# Patient Record
Sex: MALE | Race: WHITE | Employment: FULL TIME | URBAN - METROPOLITAN AREA
[De-identification: names, ages, dates, MRNs, and addresses within clinical notes are randomized per-mention and may not be internally consistent; named-entity substitution may affect disease eponyms.]

---

## 2021-06-01 ENCOUNTER — TELEPHONE (OUTPATIENT)
Dept: ADMINISTRATIVE | Age: 26
End: 2021-06-01

## 2021-06-01 NOTE — TELEPHONE ENCOUNTER
Pt is requesting to establish Dr Peter Mann as PCP; referred by Marly Powell. Pt is aware of [facility] med protocol; states no med issues.  Please advise

## 2021-06-09 ENCOUNTER — OFFICE VISIT (OUTPATIENT)
Dept: PRIMARY CARE CLINIC | Age: 26
End: 2021-06-09
Payer: COMMERCIAL

## 2021-06-09 VITALS
OXYGEN SATURATION: 97 % | HEIGHT: 67 IN | BODY MASS INDEX: 29.66 KG/M2 | RESPIRATION RATE: 16 BRPM | SYSTOLIC BLOOD PRESSURE: 116 MMHG | WEIGHT: 189 LBS | TEMPERATURE: 97 F | DIASTOLIC BLOOD PRESSURE: 68 MMHG | HEART RATE: 67 BPM

## 2021-06-09 DIAGNOSIS — D49.2 NEOPLASM OF SOFT TISSUE OF NECK: Primary | ICD-10-CM

## 2021-06-09 DIAGNOSIS — Z00.00 HEALTH MAINTENANCE EXAMINATION: ICD-10-CM

## 2021-06-09 PROCEDURE — 99203 OFFICE O/P NEW LOW 30 MIN: CPT | Performed by: FAMILY MEDICINE

## 2021-06-09 ASSESSMENT — PATIENT HEALTH QUESTIONNAIRE - PHQ9
SUM OF ALL RESPONSES TO PHQ QUESTIONS 1-9: 0
SUM OF ALL RESPONSES TO PHQ QUESTIONS 1-9: 0
1. LITTLE INTEREST OR PLEASURE IN DOING THINGS: 0
SUM OF ALL RESPONSES TO PHQ QUESTIONS 1-9: 0
SUM OF ALL RESPONSES TO PHQ9 QUESTIONS 1 & 2: 0
2. FEELING DOWN, DEPRESSED OR HOPELESS: 0

## 2021-06-09 NOTE — PROGRESS NOTES
21  Nunu Harley : 1995 Sex: male  Age: 30 y.o. Chief Complaint   Patient presents with    New Patient    Establish Care    Mass     lump on right side of neck x1 year, no pain, no growth       HPI  HPI:    Patient presents today as a new patient to establish, has noticed a lump on the right side of his neck for a year. Appeared suddenly he states and there has been no apparent change. There is no pain or symptoms associated with it. No change in weight. No night sweats fever or other systemic symptoms. No family history. Review of Systems  ROS:  Const: Denies chills, fever, malaise and sweats. Eyes: Denies discharge, pain, redness and visual disturbance. ENMT: Denies earaches, other ear symptoms. Denies nasal or sinus symptoms other than stated  above. Denies mouth and tongue lesions and sore throat. CV: Denies chest discomfort, pain; diaphoresis, dizziness, edema, lightheadedness, orthopnea,  palpitations, syncope and near syncopal episode or any exertional symptoms  Resp: Denies cough, hemoptysis, pleuritic pain, SOB, sputum production and wheezing. GI: Denies abdominal pain, change in bowel habits, hematochezia, melena, nausea and vomiting. : Denies urinary symptoms including dysuria , urgency, frequency or hematuria. Musculo: Denies musculoskeletal symptoms. Skin: Denies bruising and rash. Neuro: Denies headache, numbness, stiff neck, tingling and focal weakness slurred speech or facial  droop  Hema/Lymph: Denies bleeding/bruising tendency and enlarged lymph nodes      No current outpatient medications on file.   No Known Allergies    Past Medical History:   Diagnosis Date    Patient denies medical problems      Past Surgical History:   Procedure Laterality Date    HAND TENDON SURGERY Left     Landscaping Injury, around age 10   [place] WISDOM TOOTH EXTRACTION       Family History   Problem Relation Age of Onset    Other Neg Hx         Denies early ht disease, cancers or otherwise     Social History     Tobacco Use    Smoking status: Never Smoker    Smokeless tobacco: Never Used   Substance Use Topics    Alcohol use: Yes     Comment: occ    Drug use: No      Social History     Social History Narrative    Previous [employer] x 9 yrs    Now  for [employer], involved with regulation [employer] pressure) [employer]     Mom/Dad . Mom lives [place]. Minimal if any contact with Dad x yrs    Dale Pabon girlfriend        Vitals:    06/09/21 1453   BP: 116/68   Pulse: 67   Resp: 16   Temp: 97 °F (36.1 °C)   SpO2: 97%   Weight: 189 lb (85.7 kg)   Height: 5' 7\" (1.702 m)      Wt Readings from Last 3 Encounters:   06/09/21 189 lb (85.7 kg)        Physical Exam  Exam:  Const: Appears comfortable. No signs of acute distress present. Head/Face: Atraumatic on inspection. Eyes: EOMI in both eyes. No discharge from the eyes. PERRL. Sclerae clear. ENMT: Auditory canals normal. Tympanic membranes: intact and translucent. External nose WNL. Nasal mucosa is clear. Oropharynx: No erythema or exudate. Posterior pharynx is normal.  Neck: Supple. Palpation reveals 2 x 2 centimeter protruding well-circumscribed skin colored nodule that is firm/rubbery. No other appreciable masses/lymph nodes no JVD. Carotids: no  bruits. Resp: Respirations are unlabored. Clear to auscultation. No rales, rhonchi or wheezes appreciated  over the lungs bilaterally. CV: Rate is regular. Rhythm is regular. No gallop or rubs. No heart murmur appreciated. Extremities: No clubbing, cyanosis, or edema. No calf inflammation or tenderness. Abdomen: Bowel sounds are normoactive. Abdomen is soft, nontender, and nondistended. No  abdominal masses. No palpable hepatosplenomegaly. Lymph: No palpable or visible regional lymphadenopathy. Musculoskeletal: no acute joint inflammation. Skin: Dry and warm with no rash.  Skin normal to inspection and palpation overall. Neuro: Alert and oriented. Affect: appropriate. Upper Extremities: 5/5 bilaterally. Lower Extremities:  5/5 bilaterally. Sensation intact to light touch. Reflexes: DTR's are symmetric and 2+ bilaterally. .  Cranial Nerves: Cranial nerves grossly intact. Office Labs This Visit :  No results found for this visit on 06/09/21. Assessment and Plan:   Diagnosis Orders   1. Neoplasm of soft tissue of neck  [facility] Ear, Nose, Throat    US HEAD NECK SOFT TISSUE THYROID    TSH without Reflex    CBC Auto Differential   2. Health maintenance examination  Lipid Panel    TSH without Reflex    Comprehensive Metabolic Panel    CBC Auto Differential    Urinalysis       Neoplasm of soft tissue of neck    Counseled extensively. Differential reviewed, including serious etiologies. Although length of time present with lack of change favors a benign process including subdermoid cyst, lipoma other differential reviewed including but not limited to rare condition such as dermatofibrosarcoma protuberans as well as lymph node etc.  Refer to ENT as a starting point for excision. Check ultrasound. Check blood work. Health maintenance examination    encourage, he will schedule this in about a week or 2, check with insurance get blood work also he will bring in shot record         No flowsheet data found. Plan as above. Counseled extensively and differential diagnoses relevant to above were reviewed, including serious etiologies. Side effects and interactions of medications were reviewed. As long as symptoms steadily improve/resolve, and medical conditions follow the expected course, FU as below, sooner PRN. Return for 1-2wks fu us/bw and for physical.         Signs and symptoms to watch for discussed, serious signs and symptoms reviewed. ER if any.          Elizabeth Hayes MD    Patients are advised to check with insurance company to ensure coverage and to fully understand benefits and cost prior to any testing. This note was created with the assistance of voice recognition software. Document was reviewed however may contain grammatical errors.

## 2021-06-09 NOTE — ASSESSMENT & PLAN NOTE
encourage, he will schedule this in about a week or 2, check with insurance get blood work also he will bring in shot record

## 2021-06-18 ENCOUNTER — TELEPHONE (OUTPATIENT)
Dept: PRIMARY CARE CLINIC | Age: 26
End: 2021-06-18

## 2021-06-18 ENCOUNTER — TELEPHONE (OUTPATIENT)
Dept: ADMINISTRATIVE | Age: 26
End: 2021-06-18

## 2021-06-18 DIAGNOSIS — D49.2 NEOPLASM OF SOFT TISSUE OF NECK: Primary | ICD-10-CM

## 2021-06-18 NOTE — TELEPHONE ENCOUNTER
Referral was sent over for pt on 6/9. Pt was told to have imaging done before scheduling appt. Pt had u/s(in chart)  done on 6/17. Dr Jayme Gill is asking that pt be scheduled ASAP.

## 2021-06-18 NOTE — TELEPHONE ENCOUNTER
Yes please refer to [facility] ENT ASAP. Please have referral department call and facilitate for ASAP referral.  Diagnosis neck mass. Put rule out malignancy in the comments. .  Needs excised.   Please send ultrasound report to [facility], patient to bring films

## 2021-06-18 NOTE — TELEPHONE ENCOUNTER
Pt girlfiend calling asking for a new referral for ENT, unable to get him in until September.  Asking if he can be referred to [address] [place] [address] to an ENT

## 2021-06-18 NOTE — TELEPHONE ENCOUNTER
LVM for patient to call back to be scheduled with Dr. Eugene Thomas. Patient needs seen sooner rather than later. If patient calls back please offer 6/30/21 at 11:15am if not already triple booked.      Electronically signed by Dejuan Cardona MA on 6/18/21 at 2:35 PM EDT

## 2021-06-23 ENCOUNTER — OFFICE VISIT (OUTPATIENT)
Dept: PRIMARY CARE CLINIC | Age: 26
End: 2021-06-23
Payer: COMMERCIAL

## 2021-06-23 VITALS
SYSTOLIC BLOOD PRESSURE: 118 MMHG | OXYGEN SATURATION: 97 % | WEIGHT: 189 LBS | HEART RATE: 67 BPM | HEIGHT: 67 IN | BODY MASS INDEX: 29.66 KG/M2 | DIASTOLIC BLOOD PRESSURE: 70 MMHG | TEMPERATURE: 96.8 F

## 2021-06-23 DIAGNOSIS — Z00.00 HEALTH MAINTENANCE EXAMINATION: Primary | ICD-10-CM

## 2021-06-23 DIAGNOSIS — D49.2 NEOPLASM OF SOFT TISSUE OF NECK: ICD-10-CM

## 2021-06-23 PROCEDURE — 99395 PREV VISIT EST AGE 18-39: CPT | Performed by: FAMILY MEDICINE

## 2021-06-23 RX ORDER — AMOXICILLIN AND CLAVULANATE POTASSIUM 875; 125 MG/1; MG/1
TABLET, FILM COATED ORAL
COMMUNITY
Start: 2021-06-20

## 2021-06-23 NOTE — PROGRESS NOTES
21  Inell Gaster : 1995 Sex: male  Age: 30 y.o. Chief Complaint   Patient presents with   [address] Results       HPI  HPI:      Patient presents today for follow-up and physical.  Did not get labs I ordered yet, was in the [facility] emergency room this weekend and had some labs today including CBC CMP which were negative. They did a CT scan which I do not have access to. The ultrasound we ordered was reviewed with him. He was not able to get into local ENT in a timely fashion so it was converted to [facility], there was a 2-week delay and the mass on his neck significantly increased and so they went to the [facility] ER. He was seen by ENT there. At first they had concerns for a serious etiology but ultimately determined it to be a cyst and it was incised drained and he was placed on antibiotic/Augmentin. It is significantly better he states. Nontender. No drainage. The left it open he states. Tolerating antibiotic well. Precautions reviewed including C. difficile, risk benefits of probiotic reviewed. Culture grew skin mary      Health Maintenance:  Proper diet reviewed including Mediterranean and DASH diets. Counseled on healthy weight, appropriate exercise, avoidance of tobacco, and recommendations for minimal to no alcohol consumption. Counseled on the potential pros and cons of vitamins and supplements. Vaccines reviewed. Pt recently out of [employer], had multiple vaccines. He will try to get record before we update immunizations. Reviewed the recommendations and risk/benefits of vaccines including Td/Tdap,  Had covid vaccine x 2 pneumovax,  prevnar 13 , flu vaccine, Hepatitis vaccines, gardasil (counseled), and shingrix (patient had chicken pox). HIV and Hep C screening guidelines were reviewed. Importance of regular eye and dental exams and health reviewed. Risks/Benefits of ASA reviewed and discussed latest guidelines. Sun protection reviewed.  Notify if Abdomen: Bowel sounds are normoactive. Abdomen is soft, nontender, and nondistended. No  abdominal masses. No palpable hepatosplenomegaly. Lymph: No palpable or visible regional lymphadenopathy. Musculoskeletal: no acute joint inflammation. Skin: Dry and warm with no rash. Skin normal to inspection and palpation overall. Neuro: Alert and oriented. Affect: appropriate. Upper Extremities: 5/5 bilaterally. Lower Extremities:  5/5 bilaterally. Sensation intact to light touch. Reflexes: DTR's are symmetric and 2+ bilaterally. .  Cranial Nerves: Cranial nerves grossly intact. Genitalia NL male, testes descended without nodularity or hernia with and without valsalva. Office Labs This Visit :  No results found for this visit on 06/23/21. Assessment and Plan:   Diagnosis Orders   1. Health maintenance examination     2. Neoplasm of soft tissue of neck         Neoplasm of soft tissue of neck    counseled. Determined to be a cyst at [facility] ER this weekend, it was incised and drained by ENT and he will follow up with him closely. He is on Augmentin with precautions. He has had an ultrasound and CT scan. Health maintenance examination    Health maintenance issues discussed at length as above, 6/21. Encouraged yearly physicals. No flowsheet data found. Plan as above. Counseled extensively and differential diagnoses relevant to above were reviewed, including serious etiologies. Side effects and interactions of medications were reviewed. Continue per ENT. He will get blood work as ordered by me, he is going to plan follow-up in about 6 weeks to review sooner as needed and yearly physicals          As long as symptoms steadily improve/resolve, and medical conditions follow the expected course, FU as below, sooner PRN. Return in about 6 weeks (around 8/4/2021), or if symptoms worsen or fail to improve.          Signs and symptoms to watch for discussed, serious signs and symptoms reviewed. ER if any. Tad Stark MD    Patients are advised to check with insurance company to ensure coverage and to fully understand benefits and cost prior to any testing. This note was created with the assistance of voice recognition software. Document was reviewed however may contain grammatical errors.

## 2021-06-23 NOTE — ASSESSMENT & PLAN NOTE
counseled. Determined to be a cyst at [facility] clinic ER this weekend, it was incised and drained by ENT and he will follow up with him closely. He is on Augmentin with precautions. He has had an ultrasound and CT scan.

## 2021-07-09 PROBLEM — Z00.00 HEALTH MAINTENANCE EXAMINATION: Status: RESOLVED | Noted: 2021-06-09 | Resolved: 2021-07-09

## 2023-01-06 ENCOUNTER — TELEPHONE (OUTPATIENT)
Dept: PRIMARY CARE CLINIC | Age: 28
End: 2023-01-06

## 2023-01-06 DIAGNOSIS — Z00.00 HEALTH MAINTENANCE EXAMINATION: Primary | ICD-10-CM

## 2023-01-06 NOTE — TELEPHONE ENCOUNTER
----- Message from Aldobaylee Lawrence sent at 1/6/2023 10:11 AM EST -----  Subject: Referral Request    Reason for referral request? pt is needing referral for full panel blood   work   Provider patient wants to be referred to(if known):     Provider Phone Number(if known):     Additional Information for Provider?   ---------------------------------------------------------------------------  --------------  [id number]    [phone]; OK to leave message on voicemail  ---------------------------------------------------------------------------  --------------

## 2023-01-09 DIAGNOSIS — Z00.00 HEALTH MAINTENANCE EXAMINATION: ICD-10-CM

## 2023-01-09 LAB
ALBUMIN SERPL-MCNC: 4.4 G/DL (ref 3.5–5.2)
ALP BLD-CCNC: 61 U/L (ref 40–129)
ALT SERPL-CCNC: 18 U/L (ref 0–40)
ANION GAP SERPL CALCULATED.3IONS-SCNC: 11 MMOL/L (ref 7–16)
AST SERPL-CCNC: 18 U/L (ref 0–39)
BASOPHILS ABSOLUTE: 0.03 E9/L (ref 0–0.2)
BASOPHILS RELATIVE PERCENT: 0.5 % (ref 0–2)
BILIRUB SERPL-MCNC: 0.4 MG/DL (ref 0–1.2)
BILIRUBIN URINE: NEGATIVE
BLOOD, URINE: NEGATIVE
BUN BLDV-MCNC: 16 MG/DL (ref 6–20)
CALCIUM SERPL-MCNC: 9.3 MG/DL (ref 8.6–10.2)
CHLORIDE BLD-SCNC: 105 MMOL/L (ref 98–107)
CHOLESTEROL, TOTAL: 175 MG/DL (ref 0–199)
CLARITY: CLEAR
CO2: 25 MMOL/L (ref 22–29)
COLOR: YELLOW
CREAT SERPL-MCNC: 0.9 MG/DL (ref 0.7–1.2)
EOSINOPHILS ABSOLUTE: 0.07 E9/L (ref 0.05–0.5)
EOSINOPHILS RELATIVE PERCENT: 1.2 % (ref 0–6)
GFR SERPL CREATININE-BSD FRML MDRD: >60 ML/MIN/1.73
GLUCOSE BLD-MCNC: 92 MG/DL (ref 74–99)
GLUCOSE URINE: NEGATIVE MG/DL
HCT VFR BLD CALC: 42.6 % (ref 37–54)
HDLC SERPL-MCNC: 24 MG/DL
HEMOGLOBIN: 15 G/DL (ref 12.5–16.5)
IMMATURE GRANULOCYTES #: 0.02 E9/L
IMMATURE GRANULOCYTES %: 0.4 % (ref 0–5)
KETONES, URINE: NEGATIVE MG/DL
LDL CHOLESTEROL CALCULATED: 121 MG/DL (ref 0–99)
LEUKOCYTE ESTERASE, URINE: NEGATIVE
LYMPHOCYTES ABSOLUTE: 2.65 E9/L (ref 1.5–4)
LYMPHOCYTES RELATIVE PERCENT: 46.4 % (ref 20–42)
MCH RBC QN AUTO: 30.4 PG (ref 26–35)
MCHC RBC AUTO-ENTMCNC: 35.2 % (ref 32–34.5)
MCV RBC AUTO: 86.2 FL (ref 80–99.9)
MONOCYTES ABSOLUTE: 0.59 E9/L (ref 0.1–0.95)
MONOCYTES RELATIVE PERCENT: 10.3 % (ref 2–12)
NEUTROPHILS ABSOLUTE: 2.35 E9/L (ref 1.8–7.3)
NEUTROPHILS RELATIVE PERCENT: 41.2 % (ref 43–80)
NITRITE, URINE: NEGATIVE
PDW BLD-RTO: 12.4 FL (ref 11.5–15)
PH UA: 6 (ref 5–9)
PLATELET # BLD: 147 E9/L (ref 130–450)
PMV BLD AUTO: 11.4 FL (ref 7–12)
POTASSIUM SERPL-SCNC: 4.1 MMOL/L (ref 3.5–5)
PROTEIN UA: NEGATIVE MG/DL
RBC # BLD: 4.94 E12/L (ref 3.8–5.8)
SODIUM BLD-SCNC: 141 MMOL/L (ref 132–146)
SPECIFIC GRAVITY UA: 1.02 (ref 1–1.03)
TOTAL PROTEIN: 6.7 G/DL (ref 6.4–8.3)
TRIGL SERPL-MCNC: 152 MG/DL (ref 0–149)
TSH SERPL DL<=0.05 MIU/L-ACNC: 2.67 UIU/ML (ref 0.27–4.2)
UROBILINOGEN, URINE: 0.2 E.U./DL
VLDLC SERPL CALC-MCNC: 30 MG/DL
WBC # BLD: 5.7 E9/L (ref 4.5–11.5)

## 2023-01-10 ENCOUNTER — OFFICE VISIT (OUTPATIENT)
Dept: PRIMARY CARE CLINIC | Age: 28
End: 2023-01-10
Payer: COMMERCIAL

## 2023-01-10 VITALS
OXYGEN SATURATION: 96 % | BODY MASS INDEX: 30.76 KG/M2 | SYSTOLIC BLOOD PRESSURE: 124 MMHG | HEIGHT: 67 IN | HEART RATE: 62 BPM | DIASTOLIC BLOOD PRESSURE: 82 MMHG | WEIGHT: 196 LBS | TEMPERATURE: 97.3 F

## 2023-01-10 DIAGNOSIS — E78.5 DYSLIPIDEMIA: ICD-10-CM

## 2023-01-10 DIAGNOSIS — Z00.00 HEALTH MAINTENANCE EXAMINATION: Primary | ICD-10-CM

## 2023-01-10 PROBLEM — D49.2 NEOPLASM OF SOFT TISSUE OF NECK: Status: RESOLVED | Noted: 2021-06-09 | Resolved: 2023-01-10

## 2023-01-10 PROCEDURE — 99395 PREV VISIT EST AGE 18-39: CPT | Performed by: FAMILY MEDICINE

## 2023-01-10 SDOH — ECONOMIC STABILITY: FOOD INSECURITY: WITHIN THE PAST 12 MONTHS, THE FOOD YOU BOUGHT JUST DIDN'T LAST AND YOU DIDN'T HAVE MONEY TO GET MORE.: NEVER TRUE

## 2023-01-10 SDOH — ECONOMIC STABILITY: FOOD INSECURITY: WITHIN THE PAST 12 MONTHS, YOU WORRIED THAT YOUR FOOD WOULD RUN OUT BEFORE YOU GOT MONEY TO BUY MORE.: NEVER TRUE

## 2023-01-10 ASSESSMENT — PATIENT HEALTH QUESTIONNAIRE - PHQ9
SUM OF ALL RESPONSES TO PHQ QUESTIONS 1-9: 0
SUM OF ALL RESPONSES TO PHQ9 QUESTIONS 1 & 2: 0
SUM OF ALL RESPONSES TO PHQ QUESTIONS 1-9: 0
1. LITTLE INTEREST OR PLEASURE IN DOING THINGS: 0
SUM OF ALL RESPONSES TO PHQ QUESTIONS 1-9: 0
2. FEELING DOWN, DEPRESSED OR HOPELESS: 0
SUM OF ALL RESPONSES TO PHQ QUESTIONS 1-9: 0

## 2023-01-10 ASSESSMENT — SOCIAL DETERMINANTS OF HEALTH (SDOH): HOW HARD IS IT FOR YOU TO PAY FOR THE VERY BASICS LIKE FOOD, HOUSING, MEDICAL CARE, AND HEATING?: NOT HARD AT ALL

## 2023-01-10 NOTE — PROGRESS NOTES
Varsha Au : 1995 Sex: male  Age: 32 y.o. Chief Complaint   Patient presents with    Annual Exam    Health Maintenance    Discuss Labs         HPI:        Presents today for follow-up, feeling well. Neck lesion excised, benign no recurrence, says it was a cyst    ROS:  Const: Denies chills, fever, malaise and sweats. Eyes: Denies discharge, pain, redness and visual disturbance. ENMT: Denies earaches, other ear symptoms. Denies nasal or sinus symptoms other than stated  above. Denies mouth and tongue lesions and sore throat. CV: Denies chest discomfort, pain; diaphoresis, dizziness, edema, lightheadedness, orthopnea,  palpitations, syncope and near syncopal episode or any exertional symptoms  Resp: Denies cough, hemoptysis, pleuritic pain, SOB, sputum production and wheezing. GI: Denies abdominal pain, change in bowel habits, hematochezia, melena, nausea and vomiting. : Denies urinary symptoms including dysuria , urgency, frequency or hematuria. Musculo: Denies musculoskeletal symptoms. Skin: Denies bruising and rash. Neuro: Denies headache, numbness, stiff neck, tingling and focal weakness slurred speech or facial  droop  Hema/Lymph: Denies bleeding/bruising tendency and enlarged lymph nodes         Health Maintenance:  Proper diet reviewed including Mediterranean and DASH diets. Counseled on healthy weight, appropriate exercise, avoidance of tobacco, and recommendations for minimal to no alcohol consumption. Counseled on the potential pros and cons of vitamins and supplements. Vaccines reviewed. Pt recently out of [employer], had multiple vaccines. He will try to get record before we update immunizations. Reviewed the recommendations and risk/benefits of vaccines including J/J x 1 (counseled on booster) Td/Tdap (10/22),   pneumovax,  prevnar 13 , flu vaccine (Declines) , Hepatitis vaccines, gardasil (counseled), and shingrix (patient had chicken pox).  HIV and Hep C screening guidelines were reviewed.  Importance of regular eye and dental exams and health reviewed.  Risks/Benefits of ASA reviewed and discussed latest guidelines. Sun protection reviewed. Notify if any new or changing moles/skin lesions, etc. Dexa Scan indications/ risk factors for osteoporotic fractures (and associated M/M) and preventative measures reviewed.  Counseled on testicular exams and prostate exams. Colonoscopy recommendations reviewed.  Pt denies change in bowel habits  But FMH of colon polyps/CA.  Fit test discussed.        Indications for EKG and other  additional  cardiac testing including referrals, stress testing,  2d echo, ect.  dasx  Reviewed indications for other testing such as  PFT's.and  indications for imaging including brain, carotid, chest, abdominal, aortic .  dasx     Blood work reviewed, CMP normal triglyceride 152  HDL 24, counseled, TSH normal CBC grossly normal differential reviewed urinalysis negative    Most Recent Labs  CBC  Lab Results   Component Value Date/Time    WBC 5.7 01/09/2023 07:37 AM    RBC 4.94 01/09/2023 07:37 AM    HGB 15.0 01/09/2023 07:37 AM    HCT 42.6 01/09/2023 07:37 AM    MCV 86.2 01/09/2023 07:37 AM     01/09/2023 07:37 AM      CMP  Lab Results   Component Value Date/Time     01/09/2023 07:37 AM    K 4.1 01/09/2023 07:37 AM     01/09/2023 07:37 AM    CO2 25 01/09/2023 07:37 AM    ANIONGAP 11 01/09/2023 07:37 AM    GLUCOSE 92 01/09/2023 07:37 AM    BUN 16 01/09/2023 07:37 AM    CREATININE 0.9 01/09/2023 07:37 AM    LABGLOM >60 01/09/2023 07:37 AM    CALCIUM 9.3 01/09/2023 07:37 AM    PROT 6.7 01/09/2023 07:37 AM    LABALBU 4.4 01/09/2023 07:37 AM    BILITOT 0.4 01/09/2023 07:37 AM    ALKPHOS 61 01/09/2023 07:37 AM    AST 18 01/09/2023 07:37 AM    ALT 18 01/09/2023 07:37 AM     A1C  No results found for: LABA1C  TSH  Lab Results   Component Value Date/Time    TSH 2.670 01/09/2023 07:37 AM     FREET4  No results found for: R6GIRVZ  LIPID  Lab Results  Component Value Date/Time    CHOL 175 01/09/2023 07:37 AM    HDL 24 01/09/2023 07:37 AM    LDLCALC 121 01/09/2023 07:37 AM    TRIG 152 01/09/2023 07:37 AM     VITAMIN D  No results found for: VITD25  MAGNESIUM  No results found for: MG   PHOS  No results found for: PHOS   NAVA   No results found for: NAVA  RHEUMATOID FACTOR  No results found for: RF  PSA  No results found for: PSA   HEPATITIS C  No results found for: HCVABI  HIV  No results found for: UVM8VSQ, HIV1QT  UA  Lab Results   Component Value Date/Time    COLORU Yellow 01/09/2023 07:37 AM    CLARITYU Clear 01/09/2023 07:37 AM    GLUCOSEU Negative 01/09/2023 07:37 AM    BILIRUBINUR Negative 01/09/2023 07:37 AM    KETUA Negative 01/09/2023 07:37 AM    SPECGRAV 1.025 01/09/2023 07:37 AM    BLOODU Negative 01/09/2023 07:37 AM    PHUR 6.0 01/09/2023 07:37 AM    PROTEINU Negative 01/09/2023 07:37 AM    UROBILINOGEN 0.2 01/09/2023 07:37 AM    NITRU Negative 01/09/2023 07:37 AM    LEUKOCYTESUR Negative 01/09/2023 07:37 AM     Urine Micro/Albumin Ratio  No results found for: MALBCR      No current outpatient medications on file. No Known Allergies    Past Medical History:   Diagnosis Date    Patient denies medical problems      Past Surgical History:   Procedure Laterality Date    HAND TENDON SURGERY Left     Landscaping Injury, around age 10    WISDOM TOOTH EXTRACTION       Family History   Problem Relation Age of Onset    Colon Cancer Paternal Grandmother         early 52's    Other Neg Hx         Denies early ht disease, cancers or otherwise     Social History     Tobacco Use    Smoking status: Never    Smokeless tobacco: Never   Substance Use Topics    Alcohol use: Yes     Comment: occ    Drug use: No      Social History     Social History Narrative    Previous [employer] x 9 yrs    Now  for [employer] , [place], involved with regulation [employer] pressure) [employer]     Mom/Dad .  Mom lives [place]. Minimal if any contact with Dad x yrs    Orvan Rides girlfriend        Vitals:    01/10/23 1551   BP: 124/82   Pulse: 62   Temp: 97.3 °F (36.3 °C)   SpO2: 96%   Weight: 196 lb (88.9 kg)   Height: 5' 7\" (1.702 m)      Wt Readings from Last 3 Encounters:   01/10/23 196 lb (88.9 kg)   06/23/21 189 lb (85.7 kg)   06/09/21 189 lb (85.7 kg)          Exam:  Const: Appears comfortable. No signs of acute distress present. Head/Face: Atraumatic on inspection. Eyes: EOMI in both eyes. No discharge from the eyes. PERRL. Sclerae clear. ENMT: Auditory canals normal. Tympanic membranes: intact and translucent. External nose WNL. Nasal mucosa is clear. Oropharynx: No erythema or exudate. Posterior pharynx is normal.  Neck: Supple. Palpation reveals 2 x 2 centimeter protruding well-circumscribed skin colored nodule that is firm/rubbery. No other appreciable masses/lymph nodes no JVD. Carotids: no  bruits. Resp: Respirations are unlabored. Clear to auscultation. No rales, rhonchi or wheezes appreciated  over the lungs bilaterally. CV: Rate is regular. Rhythm is regular. No gallop or rubs. No heart murmur appreciated. Extremities: No clubbing, cyanosis, or edema. No calf inflammation or tenderness. Abdomen: Bowel sounds are normoactive. Abdomen is soft, nontender, and nondistended. No  abdominal masses. No palpable hepatosplenomegaly. Lymph: No palpable or visible regional lymphadenopathy. Musculoskeletal: no acute joint inflammation. Skin: Dry and warm with no rash. Skin normal to inspection and palpation overall. Neuro: Alert and oriented. Affect: appropriate. Upper Extremities: 5/5 bilaterally. Lower Extremities:  5/5 bilaterally. Sensation intact to light touch. Reflexes: DTR's are symmetric and 2+ bilaterally. .  Cranial Nerves: Cranial nerves grossly intact. Genitalia NL male, testes descended without nodularity or hernia with and without valsalva.        Office Labs This Visit :  No results found for this visit on 01/10/23. Assessment and Plan:   Diagnosis Orders   1. Health maintenance examination  Lipid Panel    TSH    Comprehensive Metabolic Panel    CBC with Auto Differential    Urinalysis      2. Dyslipidemia            Health maintenance examination  Health maintenance issues discussed at length as above, 1/10/2023 . Encouraged yearly physicals. Dyslipidemia    counseled, low HDL. Lifestyle modification reviewed. Check yearly         No flowsheet data found. Plan as above. Counseled extensively and differential diagnoses relevant to above were reviewed, including serious etiologies, risks and complications, especially of left uncontrolled. If relevant, instructions and  alternatives to meds/treatment reviewed, as well as interactions, and  SE's/ADRs reviewed, notify immediately if any, discontinuing new meds if any. Plan made after discussion and shared decision making. Counseled. Physical forms completed. As long as symptoms steadily improve/resolve, and medical conditions follow the expected course, FU as below, sooner PRN. Return in about 1 year (around 1/10/2024), or if symptoms worsen or fail to improve, for physical.                 Educational materials and/or home exercises printed for patient's review and were included in patient instructions on his/her After Visit Summary and given to patient at the end of visit. After discussion, patient and/or guardian verbalizes understanding, agrees, feels comfortable with and wishes to proceed with above treatment plan. Call for any pending results, FU sooner if abnormal, as needed or if any current symptoms persist/worsen. Advised patient to call with any new medication issues, and read all Rx info from pharmacy to assure aware of all possible risks and side effects of medication before taking. Reviewed age and gender appropriate health screening exams and vaccinations.   Advised patient regarding importance of keeping up with recommended health maintenance and to schedule as soon as possible if overdue, as this is important in assessing for undiagnosed pathology, especially cancer, as well as protecting against potentially harmful/life threatening disease. Patient and/or guardian verbalizes understanding and agrees with above counseling, assessment and plan. All questions answered. Signs and symptoms to watch for discussed, serious signs and symptoms reviewed. ER if any. Kike Wilson MD    Patients are advised to check with insurance company to ensure coverage and to fully understand benefits and cost prior to any testing. This note was created with the assistance of voice recognition software. Document was reviewed however may contain grammatical errors.

## 2023-02-09 PROBLEM — Z00.00 HEALTH MAINTENANCE EXAMINATION: Status: RESOLVED | Noted: 2021-06-09 | Resolved: 2023-02-09

## 2023-11-22 ENCOUNTER — OFFICE VISIT (OUTPATIENT)
Dept: FAMILY MEDICINE CLINIC | Age: 28
End: 2023-11-22
Payer: COMMERCIAL

## 2023-11-22 VITALS
OXYGEN SATURATION: 97 % | HEART RATE: 80 BPM | WEIGHT: 192 LBS | SYSTOLIC BLOOD PRESSURE: 134 MMHG | RESPIRATION RATE: 18 BRPM | BODY MASS INDEX: 27.49 KG/M2 | HEIGHT: 70 IN | DIASTOLIC BLOOD PRESSURE: 84 MMHG | TEMPERATURE: 97.7 F

## 2023-11-22 DIAGNOSIS — J40 SINOBRONCHITIS: Primary | ICD-10-CM

## 2023-11-22 DIAGNOSIS — J32.9 SINOBRONCHITIS: Primary | ICD-10-CM

## 2023-11-22 PROCEDURE — 99213 OFFICE O/P EST LOW 20 MIN: CPT | Performed by: NURSE PRACTITIONER

## 2023-11-22 RX ORDER — BENZONATATE 100 MG/1
100 CAPSULE ORAL 3 TIMES DAILY PRN
Qty: 30 CAPSULE | Refills: 0 | Status: SHIPPED | OUTPATIENT
Start: 2023-11-22 | End: 2023-12-02

## 2023-11-22 RX ORDER — DOXYCYCLINE HYCLATE 100 MG
100 TABLET ORAL 2 TIMES DAILY
Qty: 14 TABLET | Refills: 0 | Status: SHIPPED | OUTPATIENT
Start: 2023-11-22 | End: 2023-11-29

## 2023-11-22 RX ORDER — METHYLPREDNISOLONE 4 MG/1
TABLET ORAL
Qty: 1 KIT | Refills: 0 | Status: SHIPPED | OUTPATIENT
Start: 2023-11-22

## 2023-11-27 ENCOUNTER — HOSPITAL ENCOUNTER (EMERGENCY)
Age: 28
Discharge: HOME | End: 2023-11-27
Payer: COMMERCIAL

## 2023-11-27 ENCOUNTER — APPOINTMENT (OUTPATIENT)
Dept: GENERAL RADIOLOGY | Age: 28
End: 2023-11-27
Payer: COMMERCIAL

## 2023-11-27 VITALS
RESPIRATION RATE: 16 BRPM | HEART RATE: 87 BPM | DIASTOLIC BLOOD PRESSURE: 98 MMHG | OXYGEN SATURATION: 99 % | SYSTOLIC BLOOD PRESSURE: 153 MMHG | HEIGHT: 67 IN | WEIGHT: 180 LBS | TEMPERATURE: 97.4 F | BODY MASS INDEX: 28.25 KG/M2

## 2023-11-27 DIAGNOSIS — R03.0 ELEVATED BLOOD PRESSURE READING: ICD-10-CM

## 2023-11-27 DIAGNOSIS — S61.209A AVULSION OF SKIN OF FINGER, INITIAL ENCOUNTER: Primary | ICD-10-CM

## 2023-11-27 PROCEDURE — 96372 THER/PROPH/DIAG INJ SC/IM: CPT

## 2023-11-27 PROCEDURE — 99284 EMERGENCY DEPT VISIT MOD MDM: CPT

## 2023-11-27 PROCEDURE — 6370000000 HC RX 637 (ALT 250 FOR IP): Performed by: PHYSICIAN ASSISTANT

## 2023-11-27 PROCEDURE — 6360000002 HC RX W HCPCS: Performed by: PHYSICIAN ASSISTANT

## 2023-11-27 PROCEDURE — 73130 X-RAY EXAM OF HAND: CPT

## 2023-11-27 RX ORDER — CEPHALEXIN 500 MG/1
500 CAPSULE ORAL 4 TIMES DAILY
Qty: 20 CAPSULE | Refills: 0 | Status: SHIPPED | OUTPATIENT
Start: 2023-11-27 | End: 2023-12-02

## 2023-11-27 RX ORDER — ACETAMINOPHEN 325 MG/1
650 TABLET ORAL ONCE
Status: COMPLETED | OUTPATIENT
Start: 2023-11-27 | End: 2023-11-27

## 2023-11-27 RX ORDER — CEFAZOLIN SODIUM 1 G/3ML
1000 INJECTION, POWDER, FOR SOLUTION INTRAMUSCULAR; INTRAVENOUS ONCE
Status: COMPLETED | OUTPATIENT
Start: 2023-11-27 | End: 2023-11-27

## 2023-11-27 RX ADMIN — ACETAMINOPHEN 650 MG: 325 TABLET ORAL at 19:01

## 2023-11-27 RX ADMIN — CEFAZOLIN 1000 MG: 1 INJECTION, POWDER, FOR SOLUTION INTRAMUSCULAR; INTRAVENOUS at 19:01

## 2024-02-01 ENCOUNTER — OFFICE VISIT (OUTPATIENT)
Dept: PRIMARY CARE CLINIC | Age: 29
End: 2024-02-01
Payer: COMMERCIAL

## 2024-02-01 VITALS
HEART RATE: 72 BPM | TEMPERATURE: 97.9 F | SYSTOLIC BLOOD PRESSURE: 122 MMHG | BODY MASS INDEX: 28.19 KG/M2 | OXYGEN SATURATION: 97 % | WEIGHT: 180 LBS | DIASTOLIC BLOOD PRESSURE: 74 MMHG

## 2024-02-01 DIAGNOSIS — Z00.00 HEALTH MAINTENANCE EXAMINATION: Primary | ICD-10-CM

## 2024-02-01 PROCEDURE — 99395 PREV VISIT EST AGE 18-39: CPT | Performed by: FAMILY MEDICINE

## 2024-02-01 SDOH — ECONOMIC STABILITY: FOOD INSECURITY: WITHIN THE PAST 12 MONTHS, THE FOOD YOU BOUGHT JUST DIDN'T LAST AND YOU DIDN'T HAVE MONEY TO GET MORE.: NEVER TRUE

## 2024-02-01 SDOH — ECONOMIC STABILITY: HOUSING INSECURITY
IN THE LAST 12 MONTHS, WAS THERE A TIME WHEN YOU DID NOT HAVE A STEADY PLACE TO SLEEP OR SLEPT IN A SHELTER (INCLUDING NOW)?: NO

## 2024-02-01 SDOH — ECONOMIC STABILITY: FOOD INSECURITY: WITHIN THE PAST 12 MONTHS, YOU WORRIED THAT YOUR FOOD WOULD RUN OUT BEFORE YOU GOT MONEY TO BUY MORE.: NEVER TRUE

## 2024-02-01 SDOH — ECONOMIC STABILITY: INCOME INSECURITY: HOW HARD IS IT FOR YOU TO PAY FOR THE VERY BASICS LIKE FOOD, HOUSING, MEDICAL CARE, AND HEATING?: NOT HARD AT ALL

## 2024-02-01 ASSESSMENT — PATIENT HEALTH QUESTIONNAIRE - PHQ9
SUM OF ALL RESPONSES TO PHQ QUESTIONS 1-9: 0
SUM OF ALL RESPONSES TO PHQ QUESTIONS 1-9: 0
SUM OF ALL RESPONSES TO PHQ9 QUESTIONS 1 & 2: 0
SUM OF ALL RESPONSES TO PHQ QUESTIONS 1-9: 0
SUM OF ALL RESPONSES TO PHQ QUESTIONS 1-9: 0
2. FEELING DOWN, DEPRESSED OR HOPELESS: 0
1. LITTLE INTEREST OR PLEASURE IN DOING THINGS: 0

## 2024-02-01 NOTE — PROGRESS NOTES
Assessment and Plan:   Diagnosis Orders   1. Health maintenance examination              Health maintenance examination  Health maintenance issues discussed at length as above, 2/1/24.   Encouraged yearly physicals.                 No data to display                Plan as above.  Counseled extensively and differential diagnoses relevant to above were reviewed, including serious etiologies, risks and complications, especially of left uncontrolled.  If relevant, instructions and  alternatives to meds/treatment reviewed, as well as interactions, and  SE's/ADRs reviewed, notify immediately if any, discontinuing new meds if any.  Plan made after discussion and shared decision making.    Counseled.  Physical forms completed.              As long as symptoms steadily improve/resolve, and medical conditions follow the expected course, FU as below, sooner PRN.    Return in about 1 year (around 2/1/2025), or if symptoms worsen or fail to improve, for physical.                 Educational materials and/or home exercises printed for patient's review and were included in patient instructions on his/her After Visit Summary and given to patient at the end of visit.       After discussion, patient and/or guardian verbalizes understanding, agrees, feels comfortable with and wishes to proceed with above treatment plan. Call for any pending results, FU sooner if abnormal, as needed or if any current symptoms persist/worsen.      Advised patient to call with any new medication issues, and read all Rx info from pharmacy to assure aware of all possible risks and side effects of medication before taking.     Reviewed age and gender appropriate health screening exams and vaccinations.  Advised patient regarding importance of keeping up with recommended health maintenance and to schedule as soon as possible if overdue, as this is important in assessing for undiagnosed pathology, especially cancer, as well as protecting against

## 2025-02-03 ENCOUNTER — OFFICE VISIT (OUTPATIENT)
Dept: PRIMARY CARE CLINIC | Age: 30
End: 2025-02-03

## 2025-02-03 VITALS
TEMPERATURE: 97.7 F | SYSTOLIC BLOOD PRESSURE: 122 MMHG | DIASTOLIC BLOOD PRESSURE: 70 MMHG | HEART RATE: 90 BPM | BODY MASS INDEX: 30.45 KG/M2 | OXYGEN SATURATION: 96 % | WEIGHT: 194 LBS | HEIGHT: 67 IN

## 2025-02-03 DIAGNOSIS — L72.0 EPIDERMAL CYST OF NECK: ICD-10-CM

## 2025-02-03 DIAGNOSIS — Z00.00 HEALTH MAINTENANCE EXAMINATION: Primary | ICD-10-CM

## 2025-02-03 DIAGNOSIS — L98.9 SKIN LESIONS: ICD-10-CM

## 2025-02-03 SDOH — ECONOMIC STABILITY: FOOD INSECURITY: WITHIN THE PAST 12 MONTHS, YOU WORRIED THAT YOUR FOOD WOULD RUN OUT BEFORE YOU GOT MONEY TO BUY MORE.: NEVER TRUE

## 2025-02-03 SDOH — ECONOMIC STABILITY: FOOD INSECURITY: WITHIN THE PAST 12 MONTHS, THE FOOD YOU BOUGHT JUST DIDN'T LAST AND YOU DIDN'T HAVE MONEY TO GET MORE.: NEVER TRUE

## 2025-02-03 ASSESSMENT — PATIENT HEALTH QUESTIONNAIRE - PHQ9
1. LITTLE INTEREST OR PLEASURE IN DOING THINGS: NOT AT ALL
SUM OF ALL RESPONSES TO PHQ QUESTIONS 1-9: 0
2. FEELING DOWN, DEPRESSED OR HOPELESS: NOT AT ALL
2. FEELING DOWN, DEPRESSED OR HOPELESS: NOT AT ALL
SUM OF ALL RESPONSES TO PHQ9 QUESTIONS 1 & 2: 0
1. LITTLE INTEREST OR PLEASURE IN DOING THINGS: NOT AT ALL
SUM OF ALL RESPONSES TO PHQ9 QUESTIONS 1 & 2: 0
SUM OF ALL RESPONSES TO PHQ QUESTIONS 1-9: 0

## 2025-02-03 NOTE — PROGRESS NOTES
Nishant Lea : 1995 Sex: male  Age: 29 y.o.    Chief Complaint   Patient presents with    Annual Exam    Health Maintenance         HPI:      Here for Physical. Overall feeling well.  He feels the cyst is recurring on his right back, was I/D [place] in the past.  Also has a few raised skin lesions on his extremities that he states were biopsied and negative but he would like them removed if possible.  No  for this appt.        ROS:  Const: Denies chills, fever, malaise and sweats.  Eyes: Denies discharge, pain, redness and visual disturbance.  ENMT: Denies earaches, other ear symptoms. Denies nasal or sinus symptoms other than stated  above. Denies mouth and tongue lesions and sore throat.  CV: Denies chest discomfort, pain; diaphoresis, dizziness, edema, lightheadedness, orthopnea,  palpitations, syncope and near syncopal episode or any exertional symptoms  Resp: Denies cough, hemoptysis, pleuritic pain, SOB, sputum production and wheezing.  GI: Denies abdominal pain, change in bowel habits, hematochezia, melena, nausea and vomiting.  : Denies urinary symptoms including dysuria , urgency, frequency or hematuria.  Musculo: Denies musculoskeletal symptoms.  Skin: Denies bruising and rash.  As above  Neuro: Denies headache, numbness, stiff neck, tingling and focal weakness slurred speech or facial  droop  Hema/Lymph: Denies bleeding/bruising tendency and enlarged lymph nodes         Health Maintenance:  Proper diet reviewed including Mediterranean and DASH diets. Counseled on healthy weight, appropriate exercise, avoidance of tobacco, and recommendations for minimal to no alcohol consumption.  Counseled on the potential pros and cons of vitamins and supplements. Vaccines reviewed. Pt recently out of , had multiple vaccines. He will try to get record before we update immunizations.  Reviewed the recommendations and risk/benefits of vaccines including J/J x 1 (counseled on booster) Td/Tdap